# Patient Record
Sex: FEMALE | Race: WHITE | NOT HISPANIC OR LATINO | URBAN - METROPOLITAN AREA
[De-identification: names, ages, dates, MRNs, and addresses within clinical notes are randomized per-mention and may not be internally consistent; named-entity substitution may affect disease eponyms.]

---

## 2018-04-23 ENCOUNTER — EMERGENCY (EMERGENCY)
Facility: HOSPITAL | Age: 83
LOS: 1 days | Discharge: HOME | End: 2018-04-23
Attending: EMERGENCY MEDICINE
Payer: COMMERCIAL

## 2018-04-23 VITALS
HEART RATE: 67 BPM | DIASTOLIC BLOOD PRESSURE: 78 MMHG | RESPIRATION RATE: 22 BRPM | OXYGEN SATURATION: 97 % | SYSTOLIC BLOOD PRESSURE: 154 MMHG

## 2018-04-23 PROCEDURE — 73000 X-RAY EXAM OF COLLAR BONE: CPT | Mod: 26,LT

## 2018-04-23 PROCEDURE — 71045 X-RAY EXAM CHEST 1 VIEW: CPT | Mod: 26

## 2018-04-23 PROCEDURE — 73030 X-RAY EXAM OF SHOULDER: CPT | Mod: 26,76,LT

## 2018-04-23 PROCEDURE — 71250 CT THORAX DX C-: CPT | Mod: 26

## 2018-04-23 PROCEDURE — 73000 X-RAY EXAM OF COLLAR BONE: CPT

## 2018-04-23 PROCEDURE — 73070 X-RAY EXAM OF ELBOW: CPT | Mod: 26,LT

## 2018-04-23 PROCEDURE — 23650 CLTX SHO DSLC W/MNPJ WO ANES: CPT | Mod: LT

## 2018-04-23 PROCEDURE — 73060 X-RAY EXAM OF HUMERUS: CPT | Mod: 26,LT

## 2018-04-23 PROCEDURE — 70450 CT HEAD/BRAIN W/O DYE: CPT | Mod: 26

## 2018-04-23 PROCEDURE — 70450 CT HEAD/BRAIN W/O DYE: CPT

## 2018-04-23 PROCEDURE — 99284 EMERGENCY DEPT VISIT MOD MDM: CPT | Mod: 25

## 2018-04-23 PROCEDURE — 73030 X-RAY EXAM OF SHOULDER: CPT

## 2018-04-23 PROCEDURE — 73060 X-RAY EXAM OF HUMERUS: CPT

## 2018-04-23 PROCEDURE — 99284 EMERGENCY DEPT VISIT MOD MDM: CPT | Mod: 57,25

## 2018-04-23 PROCEDURE — 71045 X-RAY EXAM CHEST 1 VIEW: CPT

## 2018-04-23 PROCEDURE — 23650 CLTX SHO DSLC W/MNPJ WO ANES: CPT | Mod: 54

## 2018-04-23 PROCEDURE — 73070 X-RAY EXAM OF ELBOW: CPT

## 2018-04-23 PROCEDURE — 71250 CT THORAX DX C-: CPT

## 2018-04-23 RX ORDER — IBUPROFEN 200 MG
1 TABLET ORAL
Qty: 21 | Refills: 0 | OUTPATIENT
Start: 2018-04-23

## 2018-04-23 RX ORDER — OXYCODONE AND ACETAMINOPHEN 5; 325 MG/1; MG/1
1 TABLET ORAL ONCE
Qty: 0 | Refills: 0 | Status: DISCONTINUED | OUTPATIENT
Start: 2018-04-23 | End: 2018-04-23

## 2018-04-23 RX ORDER — IBUPROFEN 200 MG
600 TABLET ORAL ONCE
Qty: 0 | Refills: 0 | Status: COMPLETED | OUTPATIENT
Start: 2018-04-23 | End: 2018-04-23

## 2018-04-23 RX ADMIN — OXYCODONE AND ACETAMINOPHEN 1 TABLET(S): 5; 325 TABLET ORAL at 23:10

## 2018-04-23 RX ADMIN — Medication 600 MILLIGRAM(S): at 23:10

## 2018-04-23 NOTE — ED PROVIDER NOTE - NOTES
--reconsulted at 2306 after no call back. --reconsulted at 2306 after no call back.  Called back several minutes later.  Case d/w them, CT and XR appreciated, state fracture is stable and reduction in standard fashion by ED staff is acceptable.  Will see patient if reduction attempts fail.  --GERBER

## 2018-04-23 NOTE — ED PROVIDER NOTE - MUSCULOSKELETAL MINIMAL EXAM
(+) decrease ROM left shoulder on passive motion due to pain w/ step off deformity at shoulder, pulses intact, sensory intact, strength limited left upper extremiity

## 2018-04-23 NOTE — ED PROVIDER NOTE - MEDICAL DECISION MAKING DETAILS
83 y.o female s/p fall possible shoulder fracture/subluxation  1. Pain management  2. chest ct w/o contrast, left shoulder, elbow, humerus x ray 83 y.o female s/p fall possible shoulder fracture/subluxation  1. Pain management  2. chest ct w/o contrast, left shoulder, elbow, humerus x ray  Attending Statement: Agree with the above.  Mechanical fall c head trauma (small abrasion to L parietal scalp), L shoulder deformity likely representing dislocation +/- fracture.  NV exam WNL distally.  L sided chest wall TTP without stepoff or deformity/crepitus.  Pain control, CT chest/head, XR LUE, likely reduction, reassess.  --BMM

## 2018-04-23 NOTE — ED PROVIDER NOTE - CARE PLAN
Principal Discharge DX:	Shoulder dislocation, left, initial encounter  Secondary Diagnosis:	Closed fracture of head of left humerus, initial encounter Principal Discharge DX:	Shoulder dislocation, left, initial encounter  Goal:	proximal  Secondary Diagnosis:	Closed fracture of head of left humerus, initial encounter

## 2018-04-23 NOTE — ED PROVIDER NOTE - PROGRESS NOTE DETAILS
Chest CT w/o contrast Acute minimally displaced and comminuted left posterior humeral head   fracture with inferior glenohumeral dislocation. Post reduction left shoulder x ray shows reduction of dislocation. Shoulder sling placed.   Pending Head CT impression for d/c Post reduction left shoulder x ray shows reduction of dislocation. Shoulder sling placed.   Head CT negative for acute pathology. Pt is aox3, ambulatory w/ steady gait, no signs of distress. stable for discharge. Pt advised to follow up with Orthopedic clinic this week. If symptoms worsen, return to ED. Chest CT w/o contrast Acute minimally displaced and comminuted left posterior humeral head   fracture with inferior glenohumeral dislocation.  T-spine findings reviewed.  Patient re-examined; has no midline C/T/L spine TTP or deformity/stepoff. Post reduction left shoulder x ray shows reduction of dislocation. Shoulder sling placed.   Head CT negative for acute pathology. Pt is aox3, ambulatory w/ steady gait, no signs of distress. stable for discharge. Pt advised to follow up with Orthopedic clinic this week, paper copy of imaging results provided to pt. If symptoms worsen, return to ED.

## 2018-04-24 VITALS
DIASTOLIC BLOOD PRESSURE: 76 MMHG | HEART RATE: 97 BPM | OXYGEN SATURATION: 97 % | RESPIRATION RATE: 20 BRPM | TEMPERATURE: 100 F | SYSTOLIC BLOOD PRESSURE: 152 MMHG

## 2018-04-24 RX ORDER — ACETAMINOPHEN 500 MG
1 TABLET ORAL
Qty: 21 | Refills: 0
Start: 2018-04-24

## 2018-04-24 RX ORDER — IBUPROFEN 200 MG
1 TABLET ORAL
Qty: 21 | Refills: 0
Start: 2018-04-24

## 2018-04-24 RX ADMIN — OXYCODONE AND ACETAMINOPHEN 1 TABLET(S): 5; 325 TABLET ORAL at 00:09

## 2018-04-24 RX ADMIN — Medication 600 MILLIGRAM(S): at 00:08

## 2018-04-24 NOTE — ED PROCEDURE NOTE - PROCEDURE ADDITIONAL DETAILS
L shoulder prepped c chlorhexidine, 10 cc 1% lidocaine injected into G-H joint, excellent analgesia obtained.  Shoulder reduced c cunningham method.  Post procedural NV exam WNL aside from L axillary paresthesias after lidocaine injection, which improved after approx 30 min.  Patient placed in shoulder immobilizer.

## 2018-06-08 ENCOUNTER — APPOINTMENT (OUTPATIENT)
Dept: ORTHOPEDIC SURGERY | Facility: CLINIC | Age: 83
End: 2018-06-08
Payer: MEDICARE

## 2018-06-08 VITALS
DIASTOLIC BLOOD PRESSURE: 74 MMHG | HEIGHT: 60 IN | WEIGHT: 101 LBS | SYSTOLIC BLOOD PRESSURE: 164 MMHG | HEART RATE: 70 BPM | BODY MASS INDEX: 19.83 KG/M2

## 2018-06-08 DIAGNOSIS — S22.000A WEDGE COMPRESSION FRACTURE OF UNSPECIFIED THORACIC VERTEBRA, INITIAL ENCOUNTER FOR CLOSED FRACTURE: ICD-10-CM

## 2018-06-08 DIAGNOSIS — M47.812 SPONDYLOSIS W/OUT MYELOPATHY OR RADICULOPATHY, CERVICAL REGION: ICD-10-CM

## 2018-06-08 PROCEDURE — 99204 OFFICE O/P NEW MOD 45 MIN: CPT

## 2018-06-14 ENCOUNTER — APPOINTMENT (OUTPATIENT)
Dept: ORTHOPEDIC SURGERY | Facility: CLINIC | Age: 83
End: 2018-06-14
Payer: MEDICARE

## 2018-06-14 VITALS
WEIGHT: 101 LBS | DIASTOLIC BLOOD PRESSURE: 78 MMHG | SYSTOLIC BLOOD PRESSURE: 132 MMHG | HEIGHT: 60 IN | HEART RATE: 66 BPM | BODY MASS INDEX: 19.83 KG/M2

## 2018-06-14 DIAGNOSIS — Z60.2 PROBLEMS RELATED TO LIVING ALONE: ICD-10-CM

## 2018-06-14 DIAGNOSIS — M25.522 PAIN IN LEFT ELBOW: ICD-10-CM

## 2018-06-14 DIAGNOSIS — S42.255D NONDISPLACED FRACTURE OF GREATER TUBEROSITY OF LEFT HUMERUS, SUBSEQUENT ENCOUNTER FOR FRACTURE WITH ROUTINE HEALING: ICD-10-CM

## 2018-06-14 DIAGNOSIS — Z78.9 OTHER SPECIFIED HEALTH STATUS: ICD-10-CM

## 2018-06-14 DIAGNOSIS — Z56.0 UNEMPLOYMENT, UNSPECIFIED: ICD-10-CM

## 2018-06-14 DIAGNOSIS — S43.005A UNSPECIFIED DISLOCATION OF LEFT SHOULDER JOINT, INITIAL ENCOUNTER: ICD-10-CM

## 2018-06-14 PROCEDURE — 73030 X-RAY EXAM OF SHOULDER: CPT | Mod: LT

## 2018-06-14 PROCEDURE — 73080 X-RAY EXAM OF ELBOW: CPT | Mod: LT

## 2018-06-14 PROCEDURE — 99214 OFFICE O/P EST MOD 30 MIN: CPT

## 2018-06-14 RX ORDER — MULTIVITAMIN
TABLET ORAL
Refills: 0 | Status: ACTIVE | COMMUNITY

## 2018-06-14 SDOH — ECONOMIC STABILITY - INCOME SECURITY: UNEMPLOYMENT, UNSPECIFIED: Z56.0

## 2018-06-14 SDOH — SOCIAL STABILITY - SOCIAL INSECURITY: PROBLEMS RELATED TO LIVING ALONE: Z60.2

## 2018-06-15 ENCOUNTER — FORM ENCOUNTER (OUTPATIENT)
Age: 83
End: 2018-06-15

## 2018-06-16 ENCOUNTER — APPOINTMENT (OUTPATIENT)
Dept: MRI IMAGING | Facility: CLINIC | Age: 83
End: 2018-06-16
Payer: MEDICARE

## 2018-06-16 ENCOUNTER — OUTPATIENT (OUTPATIENT)
Dept: OUTPATIENT SERVICES | Facility: HOSPITAL | Age: 83
LOS: 1 days | End: 2018-06-16
Payer: COMMERCIAL

## 2018-06-16 DIAGNOSIS — Z00.8 ENCOUNTER FOR OTHER GENERAL EXAMINATION: ICD-10-CM

## 2018-06-16 PROCEDURE — 72146 MRI CHEST SPINE W/O DYE: CPT | Mod: 26

## 2018-06-16 PROCEDURE — 72146 MRI CHEST SPINE W/O DYE: CPT

## 2018-07-23 PROBLEM — M47.812 CERVICAL SPONDYLOSIS: Status: ACTIVE | Noted: 2018-06-08

## 2019-03-20 NOTE — ED PROVIDER NOTE - CPE EDP ENMT NORM
"Lashonda Eric is a 53 y.o. female in for follow up of History of anal cancer    Chronic idiopathic constipation  T1 diagnosed 2016    Pt states she is doing well  No issues with her bottom    She started a high-fiber diet, which is helping with her constipation  She also lost about 15 lbs    Most recent colonoscopy Dr. Pollack 10/2016    /90 (BP Location: Left arm, Patient Position: Sitting, Cuff Size: Adult)   Pulse 95   Temp 98.4 °F (36.9 °C) (Oral)   Ht 154.9 cm (61\")   Wt 79.6 kg (175 lb 6.4 oz)   SpO2 97%   BMI 33.14 kg/m²   Body mass index is 33.14 kg/m².      PE:  Physical Exam   Constitutional: She appears well-developed. No distress.   HENT:   Head: Normocephalic and atraumatic.   Abdominal: Soft. She exhibits no distension.   Genitourinary:   Genitourinary Comments: Perianal exam: external: no evidence dysplasia.  Small tag unchanged  PHANI- adequate tone, no masses  Anoscopy performed: no evidence dysplasia   Musculoskeletal: Normal range of motion.   Neurological: She is alert.   Psychiatric: Thought content normal.         Assessment:   1. History of anal cancer    2. Chronic idiopathic constipation     T1 diagnosed 2016    Plan:      No evidence dysplasia on exam today.  Continue close surveillance with anoscopy.    Continue high-fiber diet.  Rx refill [facility]    Call or come in if any questions or concerning symptoms      RTC 6 months      Scribed for Francy Murphy MD by Deonna Georges PA-C 3/20/2019  This patient was evaluated by me, recommendations made, documentation reviewed, edited, and revised by me, Francy Murphy MD        "
normal...

## 2019-10-02 PROBLEM — Z60.2 PERSON LIVING ALONE: Status: ACTIVE | Noted: 2018-06-08

## 2021-05-21 NOTE — ED ADULT NURSE NOTE - NEURO WDL
aching Alert and oriented to person, place and time, memory intact, behavior appropriate to situation, PERRL.

## 2024-04-02 ENCOUNTER — EMERGENCY (EMERGENCY)
Facility: HOSPITAL | Age: 89
LOS: 1 days | Discharge: HOME | End: 2024-04-02
Attending: EMERGENCY MEDICINE
Payer: MEDICARE

## 2024-04-02 VITALS
SYSTOLIC BLOOD PRESSURE: 184 MMHG | OXYGEN SATURATION: 98 % | HEART RATE: 92 BPM | TEMPERATURE: 99 F | RESPIRATION RATE: 20 BRPM | DIASTOLIC BLOOD PRESSURE: 87 MMHG

## 2024-04-02 VITALS
RESPIRATION RATE: 20 BRPM | HEIGHT: 59 IN | TEMPERATURE: 98 F | DIASTOLIC BLOOD PRESSURE: 90 MMHG | HEART RATE: 88 BPM | WEIGHT: 104.94 LBS | OXYGEN SATURATION: 96 % | SYSTOLIC BLOOD PRESSURE: 186 MMHG

## 2024-04-02 PROCEDURE — 12001 RPR S/N/AX/GEN/TRNK 2.5CM/<: CPT

## 2024-04-02 PROCEDURE — 99285 EMERGENCY DEPT VISIT HI MDM: CPT | Mod: FS,25

## 2024-04-02 PROCEDURE — 93005 ELECTROCARDIOGRAM TRACING: CPT | Mod: XU

## 2024-04-02 PROCEDURE — 72128 CT CHEST SPINE W/O DYE: CPT | Mod: 26,MC

## 2024-04-02 PROCEDURE — 70450 CT HEAD/BRAIN W/O DYE: CPT | Mod: MC

## 2024-04-02 PROCEDURE — 71250 CT THORAX DX C-: CPT | Mod: MC

## 2024-04-02 PROCEDURE — 90715 TDAP VACCINE 7 YRS/> IM: CPT

## 2024-04-02 PROCEDURE — 90471 IMMUNIZATION ADMIN: CPT

## 2024-04-02 PROCEDURE — 99284 EMERGENCY DEPT VISIT MOD MDM: CPT | Mod: 25

## 2024-04-02 PROCEDURE — 70450 CT HEAD/BRAIN W/O DYE: CPT | Mod: 26,MC

## 2024-04-02 PROCEDURE — 72125 CT NECK SPINE W/O DYE: CPT | Mod: MC

## 2024-04-02 PROCEDURE — 71250 CT THORAX DX C-: CPT | Mod: 26,MC

## 2024-04-02 PROCEDURE — 72125 CT NECK SPINE W/O DYE: CPT | Mod: 26,MC

## 2024-04-02 PROCEDURE — 93010 ELECTROCARDIOGRAM REPORT: CPT | Mod: 1L

## 2024-04-02 RX ORDER — TETANUS TOXOID, REDUCED DIPHTHERIA TOXOID AND ACELLULAR PERTUSSIS VACCINE, ADSORBED 5; 2.5; 8; 8; 2.5 [IU]/.5ML; [IU]/.5ML; UG/.5ML; UG/.5ML; UG/.5ML
0.5 SUSPENSION INTRAMUSCULAR ONCE
Refills: 0 | Status: COMPLETED | OUTPATIENT
Start: 2024-04-02 | End: 2024-04-02

## 2024-04-02 RX ORDER — LIDOCAINE 4 G/100G
1 CREAM TOPICAL ONCE
Refills: 0 | Status: COMPLETED | OUTPATIENT
Start: 2024-04-02 | End: 2024-04-02

## 2024-04-02 RX ORDER — ACETAMINOPHEN 500 MG
975 TABLET ORAL ONCE
Refills: 0 | Status: COMPLETED | OUTPATIENT
Start: 2024-04-02 | End: 2024-04-02

## 2024-04-02 RX ADMIN — TETANUS TOXOID, REDUCED DIPHTHERIA TOXOID AND ACELLULAR PERTUSSIS VACCINE, ADSORBED 0.5 MILLILITER(S): 5; 2.5; 8; 8; 2.5 SUSPENSION INTRAMUSCULAR at 20:02

## 2024-04-02 RX ADMIN — LIDOCAINE 1 PATCH: 4 CREAM TOPICAL at 23:24

## 2024-04-02 NOTE — ED PROVIDER NOTE - CLINICAL SUMMARY MEDICAL DECISION MAKING FREE TEXT BOX
89 yo F with no reported PMH presents sp mechanical fall PTA. Pt was walking up the stairs when she tripped on the step and fell hitting the top of her head on the corner of the door. No LOC. Sustained scalp laceration which began to bleed but resolved after direct pressure. Was able to get up with help from her neighbors and has been ambulatory since. C/o pain at site of laceration on her head and bilateral neck pain. Denies nausea, vomiting, chest pain, headache, dizziness, changes in speech/va, paresthesias, weakness, back pain, bladder/bowel dsfxn, saddle anesthesia. Not on AC or ASA. 89 yo F with no reported PMH presents sp mechanical fall PTA. Pt was walking up the stairs when she tripped on the step and fell hitting the top of her head on the corner of the door. No LOC. Sustained scalp laceration which began to bleed but resolved after direct pressure. Was able to get up with help from her neighbors and has been ambulatory since. C/o pain at site of laceration on her head and bilateral neck pain. Denies nausea, vomiting, chest pain, headache, dizziness, changes in speech/va, paresthesias, weakness, back pain, bladder/bowel dsfxn, saddle anesthesia. Not on AC or ASA.    Germain: 89 yea rold female s/p mechanical fall pta.  walking up the tairs and tirpped and fell hitting topof head at corner of door. no loc. PE: att exam: patient awake alert NAD. + 2 cm scalp laceration to top of head, + paraspinal c-spine ttp b/l, no midline spinal tenderness, FROM.   LUNGS CTAB no wheeze no crackle. CARD RRR no m/r/g.  Abdomen soft NT ND no rebound no guarding no CVA tenderness. EXT WWP no edema no calf tenderness CV 2+DP/PT bilaterally. neuro A&Ox3, no focal deficits gait normal.   Plan: will get imaging r/o ich, updtate tetanus, staple laceration, reassess

## 2024-04-02 NOTE — ED PROVIDER NOTE - PHYSICAL EXAMINATION
CONSTITUTIONAL: Well appearing and in no apparent distress.  ENT: Airway patent, moist mucous membranes.   EYES: Pupils equal, round and reactive to light. EOMI. Conjunctiva normal appearing.  HEAD/NECK: +Paraspinal c spine TTP bilaterally. No midline cspine TTP. FROM of neck. +Scalp laceration to top of head, +mild TTP over area. No active bleeding.   CARDIAC: Normal rate, regular rhythm.  Heart sounds S1, S2.    RESPIRATORY: Breath sounds clear and equal bilaterally.   GASTROINTESTINAL: Abdomen soft, non-tender, not distended.  MUSCULOSKELETAL: Spine appears normal. No midline TTP. FROM of all extremities.   NEUROLOGICAL: Alert and oriented x3, no focal deficits, no motor or sensory deficits. 5/5 muscle strength throughout.  SKIN: Skin normal color, warm, dry and intact.   PSYCHIATRIC: Normal mood and affect.

## 2024-04-02 NOTE — ED ADULT NURSE NOTE - NSFALLHARMRISKINTERV_ED_ALL_ED

## 2024-04-02 NOTE — ED PROVIDER NOTE - PROGRESS NOTE DETAILS
Lac repair completed and tolerated well by pt. See procedure note for details. Pt aware of CT findings of chronic compression fxs. Copy of results given. Advised Tylenol for pain control. Pt is AOx3, has her house keys. Cab ordered for her to be brought back home. Ambulating in ED w/o difficulty. Pt aware to return to ED in 7 days for staple removal. Dayron Deng PA-C

## 2024-04-02 NOTE — ED PROVIDER NOTE - NSFOLLOWUPINSTRUCTIONS_ED_ALL_ED_FT
Please make sure to follow up with your primary care doctor within 1-2 days.  Return to the ER as discussed if you develop any new or worsening symptoms.      COME BACK TO THE ER IN **** FOR STAPLE REMOVAL.    You may take Tylenol 1000mg every 6 hours as needed for pain.    Call the [facility] at [phone], for further information and follow-up as needed.  Avoid work, school, and physical activity until instructed to return by a medical provider (at least 24 hours)    **Return to the ER immediately if you experience:    -Severe or worsening headaches    -Prolonged sleeping or confusion    -Restlessness, unsteadiness, or seizures    -Difficulties with vision or speech    -Vomiting, fever, or stiff neck    -Urinary or bowel issues    -Weakness or numbness involving any part of the body Please make sure to follow up with your primary care doctor within 1-2 days.  Return to the ER as discussed if you develop any new or worsening symptoms.      COME BACK TO THE ER or YOUR DOCTOR or URGENT CARE in 7 days FOR STAPLE REMOVAL!!    You may take Tylenol 1000mg every 6 hours as needed for pain.    Call the [facility] Program at [phone], for further information and follow-up as needed.  Avoid work, school, and physical activity until instructed to return by a medical provider (at least 24 hours)    **Return to the ER immediately if you experience:    -Severe or worsening headaches    -Prolonged sleeping or confusion    -Restlessness, unsteadiness, or seizures    -Difficulties with vision or speech    -Vomiting, fever, or stiff neck    -Urinary or bowel issues    -Weakness or numbness involving any part of the body

## 2024-04-02 NOTE — ED ADULT NURSE NOTE - OBJECTIVE STATEMENT
patient is an 88 y/o F with no reported PMH BIBEMS from home s/p fall. patient states that she was walking up the stairs when she tripped, falling forward when she hit the top of her head on the corner of a door. patient ambulatory after the incident. endorsing lower back pain. patient with lac noted to the top of her head with minimal active bleeding in ED. patient a&ox4, PERRL. respirations even and unlabored. abdomen soft, nondistended. denies fevers/chills, numbness/tingling, weakness, headache, dizziness, vision changes, cp, sob, cough, abd pain, n/v/d, dysuria, hematuria, bloody stools. MD Groves at bedside to assess patient. updated on plan of care. comfort and safety maintained

## 2024-04-02 NOTE — ED PROVIDER NOTE - PATIENT PORTAL LINK FT
You can access the FollowMyHealth Patient Portal offered by [facility] by registering at the following website: [email or web address]. By joining [facility]’s FollowMyHealth portal, you will also be able to view your health information using other applications (apps) compatible with our system.

## 2024-04-09 ENCOUNTER — EMERGENCY (EMERGENCY)
Facility: HOSPITAL | Age: 89
LOS: 1 days | Discharge: HOME | End: 2024-04-09
Attending: STUDENT IN AN ORGANIZED HEALTH CARE EDUCATION/TRAINING PROGRAM | Admitting: HOSPITALIST
Payer: MEDICARE

## 2024-04-09 VITALS
HEIGHT: 59 IN | TEMPERATURE: 98 F | SYSTOLIC BLOOD PRESSURE: 97 MMHG | DIASTOLIC BLOOD PRESSURE: 74 MMHG | OXYGEN SATURATION: 96 % | RESPIRATION RATE: 20 BRPM | WEIGHT: 115.08 LBS | HEART RATE: 113 BPM

## 2024-04-09 DIAGNOSIS — M54.9 DORSALGIA, UNSPECIFIED: ICD-10-CM

## 2024-04-09 DIAGNOSIS — Z29.9 ENCOUNTER FOR PROPHYLACTIC MEASURES, UNSPECIFIED: ICD-10-CM

## 2024-04-09 DIAGNOSIS — R26.2 DIFFICULTY IN WALKING, NOT ELSEWHERE CLASSIFIED: ICD-10-CM

## 2024-04-09 DIAGNOSIS — G93.49 OTHER ENCEPHALOPATHY: ICD-10-CM

## 2024-04-09 DIAGNOSIS — R41.82 ALTERED MENTAL STATUS, UNSPECIFIED: ICD-10-CM

## 2024-04-09 LAB
ADD ON TEST-SPECIMEN IN LAB: SIGNIFICANT CHANGE UP
ALBUMIN SERPL ELPH-MCNC: 4 G/DL — SIGNIFICANT CHANGE UP (ref 3.3–5)
ALP SERPL-CCNC: 130 U/L — HIGH (ref 40–120)
ALT FLD-CCNC: 21 U/L — SIGNIFICANT CHANGE UP (ref 10–45)
AMPHET UR-MCNC: NEGATIVE — SIGNIFICANT CHANGE UP
ANION GAP SERPL CALC-SCNC: 13 MMOL/L — SIGNIFICANT CHANGE UP (ref 5–17)
APPEARANCE UR: CLEAR — SIGNIFICANT CHANGE UP
AST SERPL-CCNC: 20 U/L — SIGNIFICANT CHANGE UP (ref 10–40)
BACTERIA # UR AUTO: NEGATIVE /HPF — SIGNIFICANT CHANGE UP
BARBITURATES UR SCN-MCNC: NEGATIVE — SIGNIFICANT CHANGE UP
BASOPHILS # BLD AUTO: 0.06 K/UL — SIGNIFICANT CHANGE UP (ref 0–0.2)
BASOPHILS NFR BLD AUTO: 0.7 % — SIGNIFICANT CHANGE UP (ref 0–2)
BENZODIAZ UR-MCNC: NEGATIVE — SIGNIFICANT CHANGE UP
BILIRUB SERPL-MCNC: 0.3 MG/DL — SIGNIFICANT CHANGE UP (ref 0.2–1.2)
BILIRUB UR-MCNC: NEGATIVE — SIGNIFICANT CHANGE UP
BUN SERPL-MCNC: 16 MG/DL — SIGNIFICANT CHANGE UP (ref 7–23)
CALCIUM SERPL-MCNC: 9.7 MG/DL — SIGNIFICANT CHANGE UP (ref 8.4–10.5)
CAST: 3 /LPF — SIGNIFICANT CHANGE UP (ref 0–4)
CHLORIDE SERPL-SCNC: 100 MMOL/L — SIGNIFICANT CHANGE UP (ref 96–108)
CO2 SERPL-SCNC: 23 MMOL/L — SIGNIFICANT CHANGE UP (ref 22–31)
COCAINE METAB.OTHER UR-MCNC: NEGATIVE — SIGNIFICANT CHANGE UP
COLOR SPEC: YELLOW — SIGNIFICANT CHANGE UP
CREAT SERPL-MCNC: 0.57 MG/DL — SIGNIFICANT CHANGE UP (ref 0.5–1.3)
DIFF PNL FLD: NEGATIVE — SIGNIFICANT CHANGE UP
EGFR: 88 ML/MIN/1.73M2 — SIGNIFICANT CHANGE UP
EOSINOPHIL # BLD AUTO: 0.03 K/UL — SIGNIFICANT CHANGE UP (ref 0–0.5)
EOSINOPHIL NFR BLD AUTO: 0.3 % — SIGNIFICANT CHANGE UP (ref 0–6)
GLUCOSE SERPL-MCNC: 80 MG/DL — SIGNIFICANT CHANGE UP (ref 70–99)
GLUCOSE UR QL: NEGATIVE MG/DL — SIGNIFICANT CHANGE UP
HCT VFR BLD CALC: 40.3 % — SIGNIFICANT CHANGE UP (ref 34.5–45)
HGB BLD-MCNC: 13.7 G/DL — SIGNIFICANT CHANGE UP (ref 11.5–15.5)
IMM GRANULOCYTES NFR BLD AUTO: 0.5 % — SIGNIFICANT CHANGE UP (ref 0–0.9)
KETONES UR-MCNC: NEGATIVE MG/DL — SIGNIFICANT CHANGE UP
LEUKOCYTE ESTERASE UR-ACNC: NEGATIVE — SIGNIFICANT CHANGE UP
LYMPHOCYTES # BLD AUTO: 1.41 K/UL — SIGNIFICANT CHANGE UP (ref 1–3.3)
LYMPHOCYTES # BLD AUTO: 16.2 % — SIGNIFICANT CHANGE UP (ref 13–44)
MCHC RBC-ENTMCNC: 31.1 PG — SIGNIFICANT CHANGE UP (ref 27–34)
MCHC RBC-ENTMCNC: 34 GM/DL — SIGNIFICANT CHANGE UP (ref 32–36)
MCV RBC AUTO: 91.4 FL — SIGNIFICANT CHANGE UP (ref 80–100)
METHADONE UR-MCNC: NEGATIVE — SIGNIFICANT CHANGE UP
MONOCYTES # BLD AUTO: 0.64 K/UL — SIGNIFICANT CHANGE UP (ref 0–0.9)
MONOCYTES NFR BLD AUTO: 7.3 % — SIGNIFICANT CHANGE UP (ref 2–14)
NEUTROPHILS # BLD AUTO: 6.53 K/UL — SIGNIFICANT CHANGE UP (ref 1.8–7.4)
NEUTROPHILS NFR BLD AUTO: 75 % — SIGNIFICANT CHANGE UP (ref 43–77)
NITRITE UR-MCNC: NEGATIVE — SIGNIFICANT CHANGE UP
NRBC # BLD: 0 /100 WBCS — SIGNIFICANT CHANGE UP (ref 0–0)
OPIATES UR-MCNC: NEGATIVE — SIGNIFICANT CHANGE UP
OXYCODONE UR-MCNC: NEGATIVE — SIGNIFICANT CHANGE UP
PCP SPEC-MCNC: SIGNIFICANT CHANGE UP
PCP SPEC-MCNC: SIGNIFICANT CHANGE UP
PCP UR-MCNC: NEGATIVE — SIGNIFICANT CHANGE UP
PH UR: 6.5 — SIGNIFICANT CHANGE UP (ref 5–8)
PLATELET # BLD AUTO: 275 K/UL — SIGNIFICANT CHANGE UP (ref 150–400)
POTASSIUM SERPL-MCNC: 4.1 MMOL/L — SIGNIFICANT CHANGE UP (ref 3.5–5.3)
POTASSIUM SERPL-SCNC: 4.1 MMOL/L — SIGNIFICANT CHANGE UP (ref 3.5–5.3)
PROT SERPL-MCNC: 7.4 G/DL — SIGNIFICANT CHANGE UP (ref 6–8.3)
PROT UR-MCNC: NEGATIVE MG/DL — SIGNIFICANT CHANGE UP
RBC # BLD: 4.41 M/UL — SIGNIFICANT CHANGE UP (ref 3.8–5.2)
RBC # FLD: 12.9 % — SIGNIFICANT CHANGE UP (ref 10.3–14.5)
RBC CASTS # UR COMP ASSIST: 0 /HPF — SIGNIFICANT CHANGE UP (ref 0–4)
SODIUM SERPL-SCNC: 136 MMOL/L — SIGNIFICANT CHANGE UP (ref 135–145)
SP GR SPEC: 1.01 — SIGNIFICANT CHANGE UP (ref 1–1.03)
SQUAMOUS # UR AUTO: 2 /HPF — SIGNIFICANT CHANGE UP (ref 0–5)
THC UR QL: NEGATIVE — SIGNIFICANT CHANGE UP
UROBILINOGEN FLD QL: 0.2 MG/DL — SIGNIFICANT CHANGE UP (ref 0.2–1)
WBC # BLD: 8.71 K/UL — SIGNIFICANT CHANGE UP (ref 3.8–10.5)
WBC # FLD AUTO: 8.71 K/UL — SIGNIFICANT CHANGE UP (ref 3.8–10.5)
WBC UR QL: 0 /HPF — SIGNIFICANT CHANGE UP (ref 0–5)

## 2024-04-09 PROCEDURE — 70450 CT HEAD/BRAIN W/O DYE: CPT | Mod: 26,MC

## 2024-04-09 PROCEDURE — 71046 X-RAY EXAM CHEST 2 VIEWS: CPT | Mod: 26

## 2024-04-09 RX ORDER — ACETAMINOPHEN 500 MG
1000 TABLET ORAL ONCE
Refills: 0 | Status: COMPLETED | OUTPATIENT
Start: 2024-04-09 | End: 2024-04-09

## 2024-04-09 RX ORDER — ACETAMINOPHEN 500 MG
650 TABLET ORAL EVERY 6 HOURS
Refills: 0 | Status: DISCONTINUED | OUTPATIENT
Start: 2024-04-09 | End: 2024-04-09

## 2024-04-09 RX ORDER — LANOLIN ALCOHOL/MO/W.PET/CERES
3 CREAM (GRAM) TOPICAL AT BEDTIME
Refills: 0 | Status: DISCONTINUED | OUTPATIENT
Start: 2024-04-09 | End: 2024-04-10

## 2024-04-09 RX ORDER — ACETAMINOPHEN 500 MG
650 TABLET ORAL EVERY 6 HOURS
Refills: 0 | Status: DISCONTINUED | OUTPATIENT
Start: 2024-04-09 | End: 2024-04-10

## 2024-04-09 NOTE — ED ADULT NURSE REASSESSMENT NOTE - NS ED NURSE REASSESS COMMENT FT1
Report received from SONIA Diaz. pt is A&Ox4, respirations are even and nonlabored. Pt was assisted to the bathroom, pt was able to walk with steady gait. Pt endorses slight back discomfort but states its from laying on the stretcher and doesn't wish to have any medication interventions. Pt placed in position of comfort. Pt educated on call bell system and provided call bell. Bed in lowest position, wheels locked, appropriate side rails raised. Pt denies needs at this time.

## 2024-04-09 NOTE — H&P ADULT - PROBLEM SELECTOR PLAN 3
- Unclear cause at the moment, patient was borderline hypotensive with tachycardia on admission but this resolved without intervention  - No leukocytosis on CBC, no electrolyte abnormalities, UA wnl, CT head without any acute changes  - Urine culture pending, f/u tox screen  - F/u orthostatics - Borderline hypotensive with tachycardia on admission but this resolved without intervention, was apparently confused on arrival and not properly answering questions  - No leukocytosis on CBC, no electrolyte abnormalities, UA wnl, CT head without any acute changes  - Urine culture pending, f/u tox screen  - Orthostatics negative  - Patient at bedside was initially not answering questions appropriately but upon finding out she was unable to hear properly, upon speaking closer to her ear, she was able to answer properly, do not believe she is truly encephalopathic at this time

## 2024-04-09 NOTE — ED PROVIDER NOTE - ATTENDING APP SHARED VISIT CONTRIBUTION OF CARE
Gerardo Mckeon DO: I have personally performed a face to face medical and diagnostic evaluation of the patient. I have discussed with and reviewed the Resident's and/or ACP's and/or Medical/PA/NP student's note and agree with the History, ROS, Physical Exam and MDM unless otherwise indicated. A brief summary of my personal evaluation and impression can be found below.     89F pmhx presents to the ed for staple removal. Patient had 5 staples placed to scalp about 7 days ago after mechanical trip and fall. She states that since fall she has been having a lot of back and shoulder pain. She is having difficulty getting around at home. Has not been walking much past few days. She lives alone and takes care of a dog. She does not have any help at home. As per triage RN patient was tachycardic and complaining of dizziness in the waiting room. Patient currently denies feeling dizzy. Patient is a poor historian. She is unable to answer questions clearly. She states that her daughter lives on [address] but does not want us to contact her since she is working today. No chest pain. No fevers/chills. Pt does not remember conversation about lightheadedness in ED.    CONSTITUTIONAL: well-appearing, in NAD  SKIN: forehead ecchymosis, head laceration s/p stapling healing by secondary intention  HEAD: NCAT  NECK: Supple; non tender. Full ROM.  CARD: RRR, no tachycardia.  RESP: no resp distress.  ABD: soft, non-tender, non-distended, no rebound or guarding.  MSK: thoracic paravertebral back pain w/o bony tenderness.  PSYCH: Cooperative, appropriate, slihgtly comfused    Will remove stables however pt appears slightly confused, a/0x3 will contact daughter to assess for change in mental status, will obtain urinalysis, check labs, ct head to r/o other causes of possible AMS while trying to get history from daughter, dispo pending imaging/labs and history from daughter

## 2024-04-09 NOTE — H&P ADULT - HISTORY OF PRESENT ILLNESS
This is an 89 y/o female w/ no known PMHx who initially presented for staple removal. She had initially come to the ED on 4/2 s/p fall with headstrike and sustained a scalp laceration which was stapled. She was able to ambulate properly and CT scans revealed only known chronic compression fractures, so she was discharged home. She was told to come back in 7 days for staple removal, so she came today. She got her staples removed in the ED, but upon interview, staff noticed she was a little confused in terms of appropriately answering questions although she was still AAOx3, and was also complaining of back pain. She was not initially letting the ED contact her daughter but eventually gave them her contact info (Nela [phone]). Her daughter was contacted by the ED who stated that the patient lives alone andher unwillingness to let them contact family is unlike her.     In the ED, she was afebrile, tachycardic to 113, with soft BP on presentation (97/54). CBC wnl, CMP w/ mildly elevated ALP of 130, UA wnl, CT head wnl. ECG showing sinus tachycardia. Received 1 dose of IV tylenol in the ED.  This is an 88 y/o female w/ no known PMHx who initially presented for staple removal. She had initially come to the ED on 4/2 s/p fall with headstrike and sustained a scalp laceration which was stapled. She was able to ambulate properly and CT scans revealed only known chronic compression fractures, so she was discharged home. She was told to come back in 7 days for staple removal, so she came today. She got her staples removed in the ED, but upon interview, staff noticed she was a little confused in terms of appropriately answering questions although she was still AAOx3, and was also complaining of back pain. She was not initially letting the ED contact her daughter but eventually gave them her contact info (Nela [phone]). Her daughter was contacted by the ED who stated that the patient lives alone and her unwillingness to let them contact family is unlike her.   Upon talking to the patient, she was initially not answering questions properly, but when asked if she was understanding questions properly, she stated she couldn't properly hear. Upon speaking louder and getting closer to patient's ear she was able to understand and answer all questions appropriately. She usually walks without assistance at home, cleans the kitchen and bathroom, and takes care of her dog. Uses hot packs to manage her chronic back pain due to compression fractures, she doesn't like taking any medications.   Per ED staff, she was a one-person assist, was unsteady on her feet.    In the ED, she was afebrile, tachycardic to 113, with soft BP on presentation (97/54). CBC wnl, CMP w/ mildly elevated ALP of 130, UA wnl, CT head wnl. ECG showing sinus tachycardia. Received 1 dose of IV tylenol in the ED.

## 2024-04-09 NOTE — ED PROVIDER NOTE - PROGRESS NOTE DETAILS
Gerardo Mckeon DO: spoke with daughter Nela , pt was initially very resistant to share family information. SPoke with daughter who states this is very unlike her. Daughter was concerned that the patient is acting confused from baseline, pt lives alone, is unsure how she would get home, still complaining of back pain. Discussed with daughter who agrees discharge home is likely not safe at this time. Pt tba.

## 2024-04-09 NOTE — H&P ADULT - NSHPREVIEWOFSYSTEMS_GEN_ALL_CORE
Review of Systems:   CONSTITUTIONAL: No fever, weight loss  EYES: No eye pain, visual disturbances, or discharge  ENMT:  No difficulty hearing, tinnitus, vertigo; No sinus or throat pain  RESPIRATORY: No SOB. No cough, wheezing, chills or hemoptysis  CARDIOVASCULAR: No chest pain, palpitations, dizziness, or leg swelling  GASTROINTESTINAL: No abdominal or epigastric pain. No nausea, vomiting, or hematemesis; No diarrhea or constipation. No melena or hematochezia.  GENITOURINARY: No dysuria, frequency, hematuria, or incontinence  NEUROLOGICAL: No headaches, memory loss, loss of strength, numbness, or tremors  SKIN: No itching, burning, rashes, or lesions   LYMPH NODES: No enlarged glands  ENDOCRINE: No heat or cold intolerance; No hair loss  MUSCULOSKELETAL: No joint pain or swelling; No muscle, back pain  PSYCHIATRIC: No depression, anxiety, mood swings, or difficulty sleeping  HEME/LYMPH: No easy bruising, or bleeding gums Review of Systems:   CONSTITUTIONAL: No fever, weight loss  EYES: No eye pain, visual disturbances, or discharge  RESPIRATORY: No SOB. No cough, wheezing, chills or hemoptysis  CARDIOVASCULAR: No chest pain, palpitations, dizziness, or leg swelling  GASTROINTESTINAL: No abdominal or epigastric pain. No nausea, vomiting, or hematemesis; No diarrhea or constipation. No melena or hematochezia.  GENITOURINARY: No dysuria, frequency, hematuria, or incontinence  NEUROLOGICAL: No headaches, memory loss, loss of strength, numbness, or tremors  MUSCULOSKELETAL: +back pain; No joint pain or swelling; No muscle  PSYCHIATRIC: No depression, anxiety, mood swings, or difficulty sleeping

## 2024-04-09 NOTE — H&P ADULT - PROBLEM SELECTOR PLAN 1
- Patient has known compression fractures at T4-5, T7, T9-12, and age indeterminate but likely chronic compression fractures seen 7 days ago on T3 and L1  - Back pain likely due to this  - ATC tylenol and robaxin 250mg TID - Patient has known compression fractures at T4-5, T7, T9-12, and age indeterminate but likely chronic compression fractures seen 7 days ago on T3 and L1  - Back pain likely due to this  - Patient experienced a lot of relief with IV tylenol, can do tylenol PRN for mild-moderate pain  - Patient prefers hot packs instead of pills for pain, please supply PRN - Likely from known compression fractures, physical deconditioning  - Per ED staff, she was a one person assist  - Ambulate with assist, fall precautions  - PT consult

## 2024-04-09 NOTE — ED PROVIDER NOTE - OBJECTIVE STATEMENT
88 y/o female without significant pmhx presents to the ed for staple removal. Patient had 5 staples placed to scalp about 7 days ago after mechanical trip and fall. She states that since fall she has been having a lot of back and shoulder pain. She is having difficulty getting around at home. Has not been walking much past few days. She lives alone and takes care of a dog. She does not have any help at home. As per triage RN patient was tachycardic and complaining of dizziness in the waiting room. Patient currently denies feeling dizzy. Patient is a poor historian. She is unable to answer questions clearly. She states that her daughter lives on [address] but does not want us to contact her since she is working today. No chest pain. No fevers/chills.

## 2024-04-09 NOTE — H&P ADULT - NSHPLABSRESULTS_GEN_ALL_CORE
13.7   8.71  )-----------( 275      ( 2024 18:58 )             40.3         136  |  100  |  16  ----------------------------<  80  4.1   |  23  |  0.57    Ca    9.7      2024 18:58    TPro  7.4  /  Alb  4.0  /  TBili  0.3  /  DBili  x   /  AST  20  /  ALT  21  /  AlkPhos  130<H>  0409          LIVER FUNCTIONS - ( 2024 18:58 )  Alb: 4.0 g/dL / Pro: 7.4 g/dL / ALK PHOS: 130 U/L / ALT: 21 U/L / AST: 20 U/L / GGT: x             Urinalysis Basic - ( 2024 19:29 )    Color: Yellow / Appearance: Clear / S.011 / pH: x  Gluc: x / Ketone: Negative mg/dL  / Bili: Negative / Urobili: 0.2 mg/dL   Blood: x / Protein: Negative mg/dL / Nitrite: Negative   Leuk Esterase: Negative / RBC: 0 /HPF / WBC 0 /HPF   Sq Epi: x / Non Sq Epi: 2 /HPF / Bacteria: Negative /HPF

## 2024-04-09 NOTE — ED ADULT NURSE NOTE - OBJECTIVE STATEMENT
89 y/o F denies any Hx presenting to ED for staple removal after mechanical fall a week ago. triage nurse reported pt was feeling dizzy in waiting area however denies dizziness at the moment. pt is poor historian states she just wants to go home. pt reports since fall has been having pain to back and shoulder. pt presents with bruising to forehead and bridge of nose from fall. pt is A&Ox3 able to follow all commands. Pulse, motor, sensation present and equal in all 4 extremities. Denies HA, dizziness, blurry vision. Respirations spontaneous and unlabored on room air. Denies SOB, dyspnea, cough, CP, palpitations. Denies abd pain, N/V/D/C. Abd soft NT/ND. Denies urinary symptoms. Denies fever, chills. No sick contacts. Skin intact, warm, dry, normal for race. Ambulates independently  at baseline, however reports since fall has been having some difficulty.

## 2024-04-09 NOTE — ED PROVIDER NOTE - PHYSICAL EXAMINATION
CONSTITUTIONAL: Patient is awake, alert and oriented x 3. Patient is elderly appearing;   HEAD: NCAT  EYES: PERRL bilaterally,   ENT: Airway patent, Nasal mucosa clear  NECK: Supple,  LUNGS: CTA B/L,   HEART: RRR.+S1S2,   ABDOMEN: Soft, non-tender to palpation throughout all four quadrants,   MSK: no midline back/neck ttp, has paraspinal ttp; FROM upper and lower ext b/l,   SKIN: ecchymosis to face, 5 staples intact to forehead;   NEURO: No focal deficits,   PSYCH: poor historian

## 2024-04-09 NOTE — H&P ADULT - ASSESSMENT
89 y/o female w/ no known PMHx who initially presented for staple removal found to be borderline hypotensive with tachycardia on presentation as well as with mild confusion, back pain, and new difficulty with ambulation. Admitted for workup of encephalopathy and PT consultation.

## 2024-04-09 NOTE — H&P ADULT - NSHPPHYSICALEXAM_GEN_ALL_CORE
Vital Signs Last 24 Hrs  T(C): 36.7 (09 Apr 2024 21:36), Max: 37 (09 Apr 2024 18:45)  T(F): 98 (09 Apr 2024 21:36), Max: 98.6 (09 Apr 2024 18:45)  HR: 75 (09 Apr 2024 21:36) (73 - 113)  BP: 160/87 (09 Apr 2024 21:36) (97/74 - 160/87)  BP(mean): 108 (09 Apr 2024 18:45) (108 - 108)  RR: 15 (09 Apr 2024 21:36) (15 - 20)  SpO2: 94% (09 Apr 2024 21:36) (94% - 96%)    Parameters below as of 09 Apr 2024 21:36  Patient On (Oxygen Delivery Method): room air        CONSTITUTIONAL: Well-groomed, in no apparent distress  EYES: No conjunctival or scleral injection, non-icteric;   ENMT: No external nasal lesions; MMM  NECK: Trachea midline without palpable neck mass; thyroid not enlarged and non-tender  RESPIRATORY: Breathing comfortably; no dullness to percussion; lungs CTA without wheeze/rhonchi/rales  CARDIOVASCULAR: +S1S2, RRR, no M/G/R; pedal pulses full and symmetric; no lower extremity edema  GASTROINTESTINAL: No palpable masses or tenderness, +BS throughout, no rebound/guarding; no hepatosplenomegaly; no hernia palpated  LYMPHATIC: No cervical LAD or tenderness  SKIN: No rashes or ulcers noted  NEUROLOGIC: CN II-XII intact; sensation intact in LEs b/l to light touch  PSYCHIATRIC: A+O x 3; mood and affect appropriate; appropriate insight and judgment Vital Signs Last 24 Hrs  T(C): 36.7 (09 Apr 2024 21:36), Max: 37 (09 Apr 2024 18:45)  T(F): 98 (09 Apr 2024 21:36), Max: 98.6 (09 Apr 2024 18:45)  HR: 75 (09 Apr 2024 21:36) (73 - 113)  BP: 160/87 (09 Apr 2024 21:36) (97/74 - 160/87)  BP(mean): 108 (09 Apr 2024 18:45) (108 - 108)  RR: 15 (09 Apr 2024 21:36) (15 - 20)  SpO2: 94% (09 Apr 2024 21:36) (94% - 96%)    Parameters below as of 09 Apr 2024 21:36  Patient On (Oxygen Delivery Method): room air    CONSTITUTIONAL: Well-groomed, in no apparent distress  HEAD: Ecchymosis above R eyebrow from recent fall  EYES: No conjunctival or scleral injection, non-icteric;   ENMT: +hard of hearing  NECK: Trachea midline without palpable neck mass  RESPIRATORY: Breathing comfortably; lungs CTA without wheeze/rhonchi/rales  CARDIOVASCULAR: +S1S2, RRR, no M/G/R; no lower extremity edema  GASTROINTESTINAL: No palpable masses or tenderness, +BS throughout, no rebound/guarding  SKIN: No rashes or ulcers noted  NEUROLOGIC: Sensation intact in LEs b/l to light touch, 5/5 strength across all extremities,   PSYCHIATRIC: Awake, alert, oriented to person, place, year, and month, not to specific day initially but corrected herself

## 2024-04-09 NOTE — H&P ADULT - PROBLEM SELECTOR PLAN 2
- Likely from known compression fractures, physical deconditioning  - PT consult - Likely from known compression fractures, physical deconditioning  - Per ED staff, she was a one person assist  - Ambulate with assist, fall precautions  - PT consult - Patient has known compression fractures at T4-5, T7, T9-12, and age indeterminate but likely chronic compression fractures seen 7 days ago on T3 and L1  - Back pain likely due to this  - Experienced a lot of relief with IV tylenol, can do tylenol PRN for mild-moderate pain  - Patient prefers hot packs instead of pills for pain, please supply PRN

## 2024-04-09 NOTE — ED ADULT NURSE NOTE - NSFALLRISKINTERV_ED_ALL_ED
Assistance OOB with selected safe patient handling equipment if applicable/Assistance with ambulation/Communicate fall risk and risk factors to all staff, patient, and family/Monitor gait and stability/Provide visual cue: yellow wristband, yellow gown, etc/Reinforce activity limits and safety measures with patient and family/Call bell, personal items and telephone in reach/Instruct patient to call for assistance before getting out of bed/chair/stretcher/Non-slip footwear applied when patient is off stretcher/[place] to call system/Physically safe environment - no spills, clutter or unnecessary equipment/Purposeful Proactive Rounding/Room/bathroom lighting operational, light cord in reach

## 2024-04-09 NOTE — ED ADULT NURSE REASSESSMENT NOTE - NS ED NURSE REASSESS COMMENT FT1
Pt states she doesn't want any medication is just requesting food to eat. MD Mckeon OK'd to eat. Pt was provided sandwich and drink, pt is eating comfortably.

## 2024-04-09 NOTE — ED ADULT NURSE NOTE - NSSUHOSCREENINGYN_ED_ALL_ED
SURGERY SCHEDULING REQUIREMENTS INCLUDE:  Facility: [facility]  Admission Type: Day Surgery   Time Needed: 15 minutes  Anesthesia: Local  NPO: No   Special Equipment: None   Procedure: right C3-6 MBB #2  Dx/CPT CODE: 26295 [id number] M47.812  Anticoagulation:   Is the patient on Coumadin/Warfarin, Lovenox, Plavix, or Aspirin/Excedrin? Patient does not take any blood thinners. Hold all NSAIDs 24 hours prior to procedure.   INR Check: No  Platelets WNL?  PLT (K/mcL)   Date Value   06/05/2021 238     Sleep Apnea: No  Latex Allergy: No  Diabetic: No  Pacemaker: No  Stroke/MI in the past 6 months: No  Estimated body mass index is 19.08 kg/m² as calculated from the following:    Height as of 6/21/21: 5' 10\" (1.778 m).    Weight as of 6/21/21: 60.3 kg.    Special Instructions: Under Fluoroscopy        Yes - the patient is able to be screened

## 2024-04-10 ENCOUNTER — TRANSCRIPTION ENCOUNTER (OUTPATIENT)
Age: 89
End: 2024-04-10

## 2024-04-10 VITALS — DIASTOLIC BLOOD PRESSURE: 82 MMHG | HEART RATE: 81 BPM | OXYGEN SATURATION: 96 % | SYSTOLIC BLOOD PRESSURE: 162 MMHG

## 2024-04-10 LAB
A1C WITH ESTIMATED AVERAGE GLUCOSE RESULT: 5.4 % — SIGNIFICANT CHANGE UP (ref 4–5.6)
ALBUMIN SERPL ELPH-MCNC: 3.7 G/DL — SIGNIFICANT CHANGE UP (ref 3.3–5)
ALP SERPL-CCNC: 112 U/L — SIGNIFICANT CHANGE UP (ref 40–120)
ALT FLD-CCNC: 15 U/L — SIGNIFICANT CHANGE UP (ref 10–45)
ANION GAP SERPL CALC-SCNC: 13 MMOL/L — SIGNIFICANT CHANGE UP (ref 5–17)
AST SERPL-CCNC: 18 U/L — SIGNIFICANT CHANGE UP (ref 10–40)
BILIRUB SERPL-MCNC: 0.4 MG/DL — SIGNIFICANT CHANGE UP (ref 0.2–1.2)
BUN SERPL-MCNC: 14 MG/DL — SIGNIFICANT CHANGE UP (ref 7–23)
CALCIUM SERPL-MCNC: 9.5 MG/DL — SIGNIFICANT CHANGE UP (ref 8.4–10.5)
CHLORIDE SERPL-SCNC: 101 MMOL/L — SIGNIFICANT CHANGE UP (ref 96–108)
CO2 SERPL-SCNC: 22 MMOL/L — SIGNIFICANT CHANGE UP (ref 22–31)
CREAT SERPL-MCNC: 0.58 MG/DL — SIGNIFICANT CHANGE UP (ref 0.5–1.3)
CULTURE RESULTS: SIGNIFICANT CHANGE UP
EGFR: 88 ML/MIN/1.73M2 — SIGNIFICANT CHANGE UP
ESTIMATED AVERAGE GLUCOSE: 108 MG/DL — SIGNIFICANT CHANGE UP (ref 68–114)
GLUCOSE SERPL-MCNC: 93 MG/DL — SIGNIFICANT CHANGE UP (ref 70–99)
HCT VFR BLD CALC: 39.8 % — SIGNIFICANT CHANGE UP (ref 34.5–45)
HGB BLD-MCNC: 13.1 G/DL — SIGNIFICANT CHANGE UP (ref 11.5–15.5)
MCHC RBC-ENTMCNC: 30.4 PG — SIGNIFICANT CHANGE UP (ref 27–34)
MCHC RBC-ENTMCNC: 32.9 GM/DL — SIGNIFICANT CHANGE UP (ref 32–36)
MCV RBC AUTO: 92.3 FL — SIGNIFICANT CHANGE UP (ref 80–100)
NRBC # BLD: 0 /100 WBCS — SIGNIFICANT CHANGE UP (ref 0–0)
PLATELET # BLD AUTO: 275 K/UL — SIGNIFICANT CHANGE UP (ref 150–400)
POTASSIUM SERPL-MCNC: 3.7 MMOL/L — SIGNIFICANT CHANGE UP (ref 3.5–5.3)
POTASSIUM SERPL-SCNC: 3.7 MMOL/L — SIGNIFICANT CHANGE UP (ref 3.5–5.3)
PROT SERPL-MCNC: 7 G/DL — SIGNIFICANT CHANGE UP (ref 6–8.3)
RBC # BLD: 4.31 M/UL — SIGNIFICANT CHANGE UP (ref 3.8–5.2)
RBC # FLD: 12.9 % — SIGNIFICANT CHANGE UP (ref 10.3–14.5)
SODIUM SERPL-SCNC: 136 MMOL/L — SIGNIFICANT CHANGE UP (ref 135–145)
SPECIMEN SOURCE: SIGNIFICANT CHANGE UP
TSH SERPL-MCNC: 2.09 UIU/ML — SIGNIFICANT CHANGE UP (ref 0.27–4.2)
WBC # BLD: 9.19 K/UL — SIGNIFICANT CHANGE UP (ref 3.8–10.5)
WBC # FLD AUTO: 9.19 K/UL — SIGNIFICANT CHANGE UP (ref 3.8–10.5)

## 2024-04-10 PROCEDURE — 85025 COMPLETE CBC W/AUTO DIFF WBC: CPT

## 2024-04-10 PROCEDURE — 99285 EMERGENCY DEPT VISIT HI MDM: CPT | Mod: 25

## 2024-04-10 PROCEDURE — 83036 HEMOGLOBIN GLYCOSYLATED A1C: CPT

## 2024-04-10 PROCEDURE — 87086 URINE CULTURE/COLONY COUNT: CPT

## 2024-04-10 PROCEDURE — 70450 CT HEAD/BRAIN W/O DYE: CPT | Mod: MC

## 2024-04-10 PROCEDURE — 80053 COMPREHEN METABOLIC PANEL: CPT

## 2024-04-10 PROCEDURE — 84443 ASSAY THYROID STIM HORMONE: CPT

## 2024-04-10 PROCEDURE — 97161 PT EVAL LOW COMPLEX 20 MIN: CPT

## 2024-04-10 PROCEDURE — 82962 GLUCOSE BLOOD TEST: CPT

## 2024-04-10 PROCEDURE — 80307 DRUG TEST PRSMV CHEM ANLYZR: CPT

## 2024-04-10 PROCEDURE — 36415 COLL VENOUS BLD VENIPUNCTURE: CPT

## 2024-04-10 PROCEDURE — 93005 ELECTROCARDIOGRAM TRACING: CPT

## 2024-04-10 PROCEDURE — 81001 URINALYSIS AUTO W/SCOPE: CPT

## 2024-04-10 PROCEDURE — 85027 COMPLETE CBC AUTOMATED: CPT

## 2024-04-10 PROCEDURE — 71046 X-RAY EXAM CHEST 2 VIEWS: CPT

## 2024-04-10 RX ORDER — ACETAMINOPHEN 500 MG
2 TABLET ORAL
Qty: 0 | Refills: 0 | DISCHARGE
Start: 2024-04-10

## 2024-04-10 NOTE — PHYSICAL THERAPY INITIAL EVALUATION ADULT - GENERAL OBSERVATIONS, REHAB EVAL
pt received semi-supine on stretcher in ED, A&0x3, confused at times, following 100% multi-step commands

## 2024-04-10 NOTE — PHYSICAL THERAPY INITIAL EVALUATION ADULT - PERTINENT HX OF CURRENT PROBLEM, REHAB EVAL
Pt is a 89 y/o female w/ no known PMHx who initially presented for staple removal found to be borderline hypotensive with tachycardia on presentation as well as with mild confusion, back pain, and new difficulty with ambulation. Admitted for workup of encephalopathy and PT consultation.  CXR: Clear lungs.   CTH: No evidence acute hemorrhage mass or mass effect.

## 2024-04-10 NOTE — DISCHARGE NOTE PROVIDER - NSDCCPCAREPLAN_GEN_ALL_CORE_FT
PRINCIPAL DISCHARGE DIAGNOSIS  Diagnosis: Altered mental state  Assessment and Plan of Treatment: You were seen for altered mental status      SECONDARY DISCHARGE DIAGNOSES  Diagnosis: Back pain  Assessment and Plan of Treatment:      PRINCIPAL DISCHARGE DIAGNOSIS  Diagnosis: Altered mental state  Assessment and Plan of Treatment: You were seen for altered mental status. Imaging was negative for any acute changes requiring intervention.  Please return to the ER if there is any further change to mental status.      SECONDARY DISCHARGE DIAGNOSES  Diagnosis: Back pain  Assessment and Plan of Treatment: Supportive care as needed. Hot packs and tylenol as instructed on bottle.

## 2024-04-10 NOTE — PHYSICAL THERAPY INITIAL EVALUATION ADULT - ADDITIONAL COMMENTS
pt resides in an apartment alone. Pt has 3 stairs to enter and independent with all ADLs and ambulation pta. Pt owns no DME. Spoke to daughter (Nela) on phone reporting she is taking patient home and staying with her/checking in for first few days.

## 2024-04-10 NOTE — DISCHARGE NOTE NURSING/CASE MANAGEMENT/SOCIAL WORK - NSDCVIVACCINE_GEN_ALL_CORE_FT
Tdap; 02-Apr-2024 20:02; Chanel Knapp (RN); [employer]; 8gx30g2 (Exp. Date: 20-Jul-2025); IntraMuscular; Deltoid Left.; 0.5 milliLiter(s); VIS (VIS Published: 09-May-2013, VIS Presented: 02-Apr-2024);

## 2024-04-10 NOTE — CHART NOTE - NSCHARTNOTEFT_GEN_A_CORE
Based on my assessment, this patient’s condition has improved and no longer warrants inpatient status and will be changed to outpatient status prior to being discharged from the hospital.  This decision is based on the following reasons:  Patient is alert and oriented times 3, no altered mental status. PT consulted and with no skilled PT needs. Medically ready for discharge home with outpatient follow up.

## 2024-04-10 NOTE — DISCHARGE NOTE NURSING/CASE MANAGEMENT/SOCIAL WORK - NSDCPEFALRISK_GEN_ALL_CORE
For information on Fall & Injury Prevention, visit: [email or web address] OR  [email or web address] OR  https://www.cdc.gov/steadi/patient.html

## 2024-04-10 NOTE — DISCHARGE NOTE NURSING/CASE MANAGEMENT/SOCIAL WORK - PATIENT PORTAL LINK FT
You can access the FollowMyHealth Patient Portal offered by [facility] by registering at the following website: [email or web address]. By joining [employer]’s FollowMyHealth portal, you will also be able to view your health information using other applications (apps) compatible with our system.

## 2024-04-10 NOTE — DISCHARGE NOTE PROVIDER - ATTENDING DISCHARGE PHYSICAL EXAMINATION:
.  VITAL SIGNS:  T(C): 36.8 (04-10-24 @ 05:06), Max: 37 (04-09-24 @ 18:45)  T(F): 98.2 (04-10-24 @ 05:06), Max: 98.6 (04-09-24 @ 18:45)  HR: 81 (04-10-24 @ 10:18) (73 - 113)  BP: 162/82 (04-10-24 @ 10:18) (97/74 - 162/82)  BP(mean): 108 (04-09-24 @ 18:45) (108 - 108)  RR: 18 (04-10-24 @ 05:06) (15 - 20)  SpO2: 96% (04-10-24 @ 10:18) (94% - 96%)  Wt(kg): --    PHYSICAL EXAM:    Constitutional: WDWN resting comfortably in bed; NAD  Head: NC/AT  Eyes: PERRL, EOMI, anicteric sclera  ENT: no nasal discharge; uvula midline, no oropharyngeal erythema or exudates; MMM  Neck: supple; no JVD or thyromegaly  Respiratory: CTA B/L; no W/R/R, no retractions  Cardiac: +S1/S2; RRR; no M/R/G; PMI non-displaced  Gastrointestinal: abdomen soft, NT/ND; no rebound or guarding; +BSx4  Back: spine midline, no bony tenderness or step-offs; no CVAT B/L  Extremities: WWP, no clubbing or cyanosis; no peripheral edema  Musculoskeletal: NROM x4; no joint swelling, tenderness or erythema  Vascular: 2+ radial, femoral, DP/PT pulses B/L  Dermatologic: skin warm, dry and intact; no rashes, wounds, or scars  Lymphatic: no submandibular or cervical LAD  Neurologic: AAOx3; CNII-XII grossly intact; no focal deficits  Psychiatric: affect and characteristics of appearance, verbalizations, behaviors are appropriate

## 2024-04-10 NOTE — DISCHARGE NOTE PROVIDER - NSDCMRMEDTOKEN_GEN_ALL_CORE_FT
acetaminophen 325 mg oral tablet: 2 tab(s) orally every 6 hours As needed Temp greater or equal to 38C (100.4F), Mild Pain (1 - 3), Moderate Pain (4 - 6)

## 2024-04-10 NOTE — DISCHARGE NOTE PROVIDER - HOSPITAL COURSE
HPI:  This is an 89 y/o female w/ no known PMHx who initially presented for staple removal. She had initially come to the ED on 4/2 s/p fall with headstrike and sustained a scalp laceration which was stapled. She was able to ambulate properly and CT scans revealed only known chronic compression fractures, so she was discharged home. She was told to come back in 7 days for staple removal, so she came today. She got her staples removed in the ED, but upon interview, staff noticed she was a little confused in terms of appropriately answering questions although she was still AAOx3, and was also complaining of back pain. She was not initially letting the ED contact her daughter but eventually gave them her contact info (Nela [phone]). Her daughter was contacted by the ED who stated that the patient lives alone and her unwillingness to let them contact family is unlike her.   Upon talking to the patient, she was initially not answering questions properly, but when asked if she was understanding questions properly, she stated she couldn't properly hear. Upon speaking louder and getting closer to patient's ear she was able to understand and answer all questions appropriately. She usually walks without assistance at home, cleans the kitchen and bathroom, and takes care of her dog. Uses hot packs to manage her chronic back pain due to compression fractures, she doesn't like taking any medications.   Per ED staff, she was a one-person assist, was unsteady on her feet.    In the ED, she was afebrile, tachycardic to 113, with soft BP on presentation (97/54). CBC wnl, CMP w/ mildly elevated ALP of 130, UA wnl, CT head wnl. ECG showing sinus tachycardia. Received 1 dose of IV tylenol in the ED.  (09 Apr 2024 21:45)    Hospital Course:  Patient was admitted for further monitoring; ambulatory dysfunction likely 2/2 KNOWN compression fractures, physical deconditioning. Fall precautions in place, PT evaluated, no skilled needs recommended. Back pain relieved with symptomatic treatment, patient prefers hot packs instead of pills.  P  Patient is medically cleared for discharge. Medication reconciliation reviewed, revised, and resolved with Dr. Moreno who had medically cleared patient for discharge with follow-up as advised. Patient is currently stable for discharge to home at this time.    Important Medication Changes and Reason:    Active or Pending Issues Requiring Follow-up:    Advanced Directives:   [x] Full code  [ ] DNR  [ ] Hospice    Discharge Diagnoses:  Ambulatory Dysfunction  Back Pain  Other Encephalopathy HPI:  This is an 89 y/o female w/ no known PMHx who initially presented for staple removal. She had initially come to the ED on 4/2 s/p fall with headstrike and sustained a scalp laceration which was stapled. She was able to ambulate properly and CT scans revealed only known chronic compression fractures, so she was discharged home. She was told to come back in 7 days for staple removal, so she came today. She got her staples removed in the ED, but upon interview, staff noticed she was a little confused in terms of appropriately answering questions although she was still AAOx3, and was also complaining of back pain. She was not initially letting the ED contact her daughter but eventually gave them her contact info (Nela [phone]). Her daughter was contacted by the ED who stated that the patient lives alone and her unwillingness to let them contact family is unlike her.   Upon talking to the patient, she was initially not answering questions properly, but when asked if she was understanding questions properly, she stated she couldn't properly hear. Upon speaking louder and getting closer to patient's ear she was able to understand and answer all questions appropriately. She usually walks without assistance at home, cleans the kitchen and bathroom, and takes care of her dog. Uses hot packs to manage her chronic back pain due to compression fractures, she doesn't like taking any medications.   Per ED staff, she was a one-person assist, was unsteady on her feet.    In the ED, she was afebrile, tachycardic to 113, with soft BP on presentation (97/54). CBC wnl, CMP w/ mildly elevated ALP of 130, UA wnl, CT head wnl. ECG showing sinus tachycardia. Received 1 dose of IV tylenol in the ED.  (09 Apr 2024 21:45)    Hospital Course:  Patient was admitted for further monitoring for encephalopathy and ambulatory dysfunction; CT Head with no evidence acute hemorrhage mass or mass effect. Ambulatory dysfunction likely 2/2 KNOWN compression fractures, physical deconditioning. Fall precautions in place, PT evaluated, no skilled needs recommended. Back pain relieved with symptomatic treatment, patient prefers hot packs instead of pills. Can continue OTC medication as needed. Please also see downgrade note.    Patient is medically cleared for discharge. Medication reconciliation reviewed, revised, and resolved with Dr. Moreno who had medically cleared patient for discharge with follow-up as advised. Patient is currently stable for discharge to home at this time.    Important Medication Changes and Reason:    Active or Pending Issues Requiring Follow-up:  - PCP    Advanced Directives:   [x] Full code  [ ] DNR  [ ] Hospice    Discharge Diagnoses:  Ambulatory Dysfunction  Back Pain  Other Encephalopathy

## 2024-11-27 NOTE — ED PROVIDER NOTE - ATTENDING CONTRIBUTION TO CARE
Refer to the Assessment tab to view/cancel completed assessment. I have reviewed this note, the presenting symptoms, and the Chief Complaint and the History of Present Illness as documented, with the other care provider(s) and nurses on the patient care team. I have also reviewed this patient's past medical/surgical history and social/family history as reviewed and listed in this electronic medical record.  See MDM.  --BMM